# Patient Record
Sex: MALE | Race: WHITE | NOT HISPANIC OR LATINO | URBAN - METROPOLITAN AREA
[De-identification: names, ages, dates, MRNs, and addresses within clinical notes are randomized per-mention and may not be internally consistent; named-entity substitution may affect disease eponyms.]

---

## 2022-04-25 ENCOUNTER — NEW PATIENT (OUTPATIENT)
Dept: URBAN - METROPOLITAN AREA CLINIC 14 | Facility: CLINIC | Age: 77
End: 2022-04-25

## 2022-04-25 DIAGNOSIS — Z98.890: ICD-10-CM

## 2022-04-25 DIAGNOSIS — Z96.1: ICD-10-CM

## 2022-04-25 DIAGNOSIS — H25.12: ICD-10-CM

## 2022-04-25 PROCEDURE — 92015 DETERMINE REFRACTIVE STATE: CPT

## 2022-04-25 PROCEDURE — 92004 COMPRE OPH EXAM NEW PT 1/>: CPT

## 2022-04-25 ASSESSMENT — VISUAL ACUITY
OD_CC: J7
OS_SC: 20/80
OS_CC: J5
OS_CC: 20/30
OD_SC: J1
OD_CC: 20/25
OS_SC: J7
OS_BCVA: 20/25
OD_BCVA: 20/25
OD_SC: 20/70

## 2022-04-25 ASSESSMENT — TONOMETRY
OD_IOP_MMHG: 12
OS_IOP_MMHG: 14

## 2022-04-25 ASSESSMENT — KERATOMETRY
OS_AXISANGLE_DEGREES: 98
OS_K1POWER_DIOPTERS: 43.50
OD_AXISANGLE_DEGREES: 65
OS_K2POWER_DIOPTERS: 43.75
OD_K1POWER_DIOPTERS: 42.75
OS_AXISANGLE2_DEGREES: 8
OD_K2POWER_DIOPTERS: 44.00
OD_AXISANGLE2_DEGREES: 155

## 2022-04-25 NOTE — PATIENT DISCUSSION
RESIDUAL REFRACTIVE ERROR POST PC IOL OD- DISC OPT OF PRK TO FINE TUNE VISUAL OUTCOME. PATIENT UNDERSTANDS HE WILL LOOSE HER NEAR VISION HE HAS NOW.

## 2022-04-25 NOTE — PATIENT DISCUSSION
DISCUSSED CONTINUING TO FOLLOW VS SURGERY VS WEAR GLASSES. PATIENT ELECTS TO CONTINUE WITH NEW GLASSES VS SURGERY.

## 2023-09-07 ENCOUNTER — ESTABLISHED PATIENT (OUTPATIENT)
Dept: URBAN - METROPOLITAN AREA CLINIC 14 | Facility: CLINIC | Age: 78
End: 2023-09-07

## 2023-09-07 DIAGNOSIS — H25.12: ICD-10-CM

## 2023-09-07 PROCEDURE — 99214 OFFICE O/P EST MOD 30 MIN: CPT

## 2023-09-07 PROCEDURE — 92136 OPHTHALMIC BIOMETRY: CPT

## 2023-09-07 PROCEDURE — 99199ADVT ADVANCED VISION TESTING

## 2023-09-07 ASSESSMENT — KERATOMETRY
OS_K1POWER_DIOPTERS: 44.00
OD_AXISANGLE_DEGREES: 76
OS_AXISANGLE2_DEGREES: 89
OS_AXISANGLE_DEGREES: 179
OD_K1POWER_DIOPTERS: 42.75
OD_K2POWER_DIOPTERS: 44.00
OS_K2POWER_DIOPTERS: 44.75
OD_AXISANGLE2_DEGREES: 166

## 2023-09-07 ASSESSMENT — VISUAL ACUITY
OD_CC: 20/30
OD_BCVA: 20/25
OS_BCVA: 20/25
OS_CC: 20/30

## 2023-09-07 ASSESSMENT — TONOMETRY
OD_IOP_MMHG: 9
OS_IOP_MMHG: 12

## 2023-10-30 ENCOUNTER — ESTABLISHED PATIENT (OUTPATIENT)
Dept: URBAN - METROPOLITAN AREA CLINIC 14 | Facility: CLINIC | Age: 78
End: 2023-10-30

## 2023-10-30 DIAGNOSIS — H52.7: ICD-10-CM

## 2023-10-30 PROCEDURE — 66984LALA LAL ADJUSTMENT: Mod: NC,LT

## 2023-10-30 ASSESSMENT — VISUAL ACUITY
OS_BCVA: 20/20
OS_SC: 20/30

## 2023-11-06 ENCOUNTER — POST-OP (OUTPATIENT)
Dept: URBAN - METROPOLITAN AREA CLINIC 14 | Facility: CLINIC | Age: 78
End: 2023-11-06

## 2023-11-06 DIAGNOSIS — Z96.1: ICD-10-CM

## 2023-11-06 PROCEDURE — 66984LALA LAL ADJUSTMENT

## 2023-11-06 ASSESSMENT — VISUAL ACUITY: OS_SC: 20/25

## 2023-11-09 ENCOUNTER — POST-OP (OUTPATIENT)
Dept: URBAN - METROPOLITAN AREA CLINIC 14 | Facility: CLINIC | Age: 78
End: 2023-11-09

## 2023-11-09 DIAGNOSIS — Z96.1: ICD-10-CM

## 2023-11-09 PROCEDURE — 66984LALA LAL ADJUSTMENT

## 2023-11-09 ASSESSMENT — VISUAL ACUITY: OS_SC: 20/20

## 2023-11-13 ENCOUNTER — POST-OP (OUTPATIENT)
Dept: URBAN - METROPOLITAN AREA CLINIC 14 | Facility: CLINIC | Age: 78
End: 2023-11-13

## 2023-11-13 DIAGNOSIS — Z96.1: ICD-10-CM

## 2023-11-13 PROCEDURE — 66984LALA LAL ADJUSTMENT

## 2024-03-01 ENCOUNTER — ESTABLISHED PATIENT (OUTPATIENT)
Dept: URBAN - METROPOLITAN AREA CLINIC 14 | Facility: CLINIC | Age: 79
End: 2024-03-01

## 2024-03-01 VITALS — HEIGHT: 60 IN

## 2024-03-01 DIAGNOSIS — H26.492: ICD-10-CM

## 2024-03-01 PROCEDURE — 99214 OFFICE O/P EST MOD 30 MIN: CPT

## 2024-03-01 ASSESSMENT — KERATOMETRY
OS_K1POWER_DIOPTERS: 44.00
OD_K1POWER_DIOPTERS: 42.50
OD_AXISANGLE_DEGREES: 85
OD_AXISANGLE2_DEGREES: 175
OD_K2POWER_DIOPTERS: 44.00
OS_AXISANGLE_DEGREES: 0
OS_K2POWER_DIOPTERS: 44.00
OS_AXISANGLE2_DEGREES: 90

## 2024-03-01 ASSESSMENT — TONOMETRY
OD_IOP_MMHG: 11
OS_IOP_MMHG: 9

## 2024-03-01 ASSESSMENT — VISUAL ACUITY
OS_BCVA: 20/25
OD_SC: 20/50
OS_SC: 20/25
OU_SC: 20/25
OD_PH: 20/30
OD_BCVA: 20/25

## 2024-04-03 ASSESSMENT — KERATOMETRY
OS_K1POWER_DIOPTERS: 44.00
OD_AXISANGLE_DEGREES: 85
OS_AXISANGLE2_DEGREES: 90
OD_K1POWER_DIOPTERS: 42.50
OS_K2POWER_DIOPTERS: 44.00
OD_AXISANGLE2_DEGREES: 175
OS_AXISANGLE_DEGREES: 0
OD_K2POWER_DIOPTERS: 44.00

## 2024-04-12 ENCOUNTER — ESTABLISHED PATIENT (OUTPATIENT)
Facility: LOCATION | Age: 79
End: 2024-04-12

## 2024-04-12 DIAGNOSIS — H26.492: ICD-10-CM

## 2024-04-12 PROCEDURE — 66821 AFTER CATARACT LASER SURGERY: CPT | Mod: 54,LT,79,LT
